# Patient Record
Sex: FEMALE | Race: WHITE
[De-identification: names, ages, dates, MRNs, and addresses within clinical notes are randomized per-mention and may not be internally consistent; named-entity substitution may affect disease eponyms.]

---

## 2018-03-20 ENCOUNTER — HOSPITAL ENCOUNTER (INPATIENT)
Dept: HOSPITAL 82 - ED | Age: 63
LOS: 3 days | Discharge: HOME | DRG: 189 | End: 2018-03-23
Attending: INTERNAL MEDICINE | Admitting: INTERNAL MEDICINE
Payer: OTHER GOVERNMENT

## 2018-03-20 VITALS — DIASTOLIC BLOOD PRESSURE: 52 MMHG | SYSTOLIC BLOOD PRESSURE: 107 MMHG

## 2018-03-20 VITALS — DIASTOLIC BLOOD PRESSURE: 56 MMHG | SYSTOLIC BLOOD PRESSURE: 106 MMHG

## 2018-03-20 VITALS — DIASTOLIC BLOOD PRESSURE: 71 MMHG | SYSTOLIC BLOOD PRESSURE: 137 MMHG

## 2018-03-20 VITALS — DIASTOLIC BLOOD PRESSURE: 59 MMHG | SYSTOLIC BLOOD PRESSURE: 100 MMHG

## 2018-03-20 VITALS — SYSTOLIC BLOOD PRESSURE: 107 MMHG | DIASTOLIC BLOOD PRESSURE: 56 MMHG

## 2018-03-20 VITALS — WEIGHT: 182.25 LBS | BODY MASS INDEX: 27.62 KG/M2 | HEIGHT: 68 IN

## 2018-03-20 VITALS — SYSTOLIC BLOOD PRESSURE: 115 MMHG | DIASTOLIC BLOOD PRESSURE: 61 MMHG

## 2018-03-20 VITALS — SYSTOLIC BLOOD PRESSURE: 115 MMHG | DIASTOLIC BLOOD PRESSURE: 63 MMHG

## 2018-03-20 VITALS — DIASTOLIC BLOOD PRESSURE: 52 MMHG | SYSTOLIC BLOOD PRESSURE: 120 MMHG

## 2018-03-20 VITALS — DIASTOLIC BLOOD PRESSURE: 64 MMHG | SYSTOLIC BLOOD PRESSURE: 103 MMHG

## 2018-03-20 DIAGNOSIS — J44.1: ICD-10-CM

## 2018-03-20 DIAGNOSIS — I24.8: ICD-10-CM

## 2018-03-20 DIAGNOSIS — F17.210: ICD-10-CM

## 2018-03-20 DIAGNOSIS — I44.7: ICD-10-CM

## 2018-03-20 DIAGNOSIS — Z91.14: ICD-10-CM

## 2018-03-20 DIAGNOSIS — I10: ICD-10-CM

## 2018-03-20 DIAGNOSIS — I34.0: ICD-10-CM

## 2018-03-20 DIAGNOSIS — J96.21: Primary | ICD-10-CM

## 2018-03-20 LAB
ALBUMIN SERPL-MCNC: 4.3 G/DL (ref 3.2–5)
ALP SERPL-CCNC: 69 U/L (ref 38–126)
ALT SERPL-CCNC: 19 U/L (ref 11–66)
ANION GAP SERPL CALCULATED.3IONS-SCNC: 27 MMOL/L
AST SERPL-CCNC: 20 U/L (ref 9–36)
BARBITURATES UR-MCNC: NEGATIVE UG/ML
BASOPHILS NFR BLD AUTO: 0 % (ref 0–3)
BILIRUB UR QL STRIP.AUTO: NEGATIVE
BUN SERPL-MCNC: 17 MG/DL (ref 8–23)
BUN/CREAT SERPL: 12
CHLORIDE SERPL-SCNC: 101 MMOL/L (ref 95–108)
CLARITY UR: (no result)
CO2 SERPL-SCNC: 16 MMOL/L (ref 22–30)
COCAINE UR-MCNC: NEGATIVE NG/ML
COLOR UR AUTO: YELLOW
CREAT SERPL-MCNC: 1.3 MG/DL (ref 0.5–1)
EOSINOPHIL NFR BLD AUTO: 0 % (ref 0–8)
EPI CELLS URNS QL MICRO: (no result) EPI/HPF
ERYTHROCYTE [DISTWIDTH] IN BLOOD BY AUTOMATED COUNT: 12.5 % (ref 11.5–15.5)
ETHANOL SERPL-MCNC: 0 MG/DL (ref 0–30)
GLUCOSE UR STRIP.AUTO-MCNC: NEGATIVE MG/DL
HCT VFR BLD AUTO: 51.5 % (ref 37–47)
HGB BLD-MCNC: 16.4 G/DL (ref 12–16)
HGB UR QL STRIP.AUTO: (no result)
IMM GRANULOCYTES NFR BLD: 3.2 % (ref 0–1)
INR PPP: 0.9 RATIO (ref 0.7–1.3)
KETONES UR STRIP.AUTO-MCNC: (no result) MG/DL
LEUKOCYTE ESTERASE UR QL STRIP.AUTO: NEGATIVE
LYMPHOCYTES NFR BLD: 25 % (ref 15–41)
MCH RBC QN AUTO: 33.2 PG  CALC (ref 26–32)
MCHC RBC AUTO-ENTMCNC: 31.8 G/L CALC (ref 32–36)
MCV RBC AUTO: 104.3 FL  CALC (ref 80–100)
METHADONE SERPL-MCNC: NEGATIVE NG/ML
MONOCYTES NFR BLD AUTO: 5 % (ref 2–13)
MYOGLOBIN SERPL-MCNC: 81 NG/ML (ref 0–62)
NEUTROPHILS # BLD AUTO: 15.58 THOU/UL (ref 2–7.15)
NEUTROPHILS NFR BLD AUTO: 66 % (ref 42–76)
NITRITE UR QL STRIP.AUTO: NEGATIVE
OXCYCODONE: NEGATIVE
PH UR STRIP.AUTO: 6 [PH] (ref 4.5–8)
PLATELET # BLD AUTO: 270 THOU/UL (ref 130–400)
POTASSIUM SERPL-SCNC: 5.3 MMOL/L (ref 3.5–5.1)
PROT SERPL-MCNC: 6.8 G/DL (ref 6.3–8.2)
PROT UR QL STRIP.AUTO: 30 MG/DL
PROTHROMBIN TIME: 9.9 SECONDS (ref 9–12.5)
RBC # BLD AUTO: 4.94 MILL/UL (ref 4.2–5.6)
RBC #/AREA URNS HPF: (no result) RBC/HPF (ref 0–5)
SODIUM SERPL-SCNC: 139 MMOL/L (ref 137–146)
SP GR UR STRIP.AUTO: 1.02
TETRAHYDROCANNABIONOL: NEGATIVE
TRICYLIC ANTIDEPRESSANTS: NEGATIVE
UROBILINOGEN UR QL STRIP.AUTO: 0.2 E.U./DL

## 2018-03-20 PROCEDURE — 0T9B70Z DRAINAGE OF BLADDER WITH DRAINAGE DEVICE, VIA NATURAL OR ARTIFICIAL OPENING: ICD-10-PCS | Performed by: EMERGENCY MEDICINE

## 2018-03-20 PROCEDURE — 5A09357 ASSISTANCE WITH RESPIRATORY VENTILATION, LESS THAN 24 CONSECUTIVE HOURS, CONTINUOUS POSITIVE AIRWAY PRESSURE: ICD-10-PCS | Performed by: EMERGENCY MEDICINE

## 2018-03-20 NOTE — NUR
Admission Note
 
 
Report Given to:         ANNE PRITNED TO FLOOR
Transported by:          X Wheelchair           Stretcher
 
Transported with:        X Nurse    X Transporter   X Patent IV   X O2
                         X Cardiac Monitor

## 2018-03-20 NOTE — NUR
PT DOING MUCH BETTER ON BiPAP NOW.  SATS IN THE HIGH 90s, PT ABLE TO SPEAK
CLEARLY OVER IT.  MEDS RECONCILED FROM LIST IN HER PAPER WORK.

## 2018-03-20 NOTE — NUR
PT'S UNCLE FRANTZ HALL LEAVES PHONE NUMBER [phone].  HE WAS THE ONE WHO
FOUND HER ON HER HANDS AND KNEES TODAY, GASPING TO BREATHE.

## 2018-03-20 NOTE — NUR
PT IN [place] A/O X3, RESPIRATIONS EVEN AND UNLABORED ON BIPAP 30%. O2 SAT
99%, LUNG SOUNDS COARSE AND WHEEZY.  VELAZQUEZ DRAINING DARK YELLOW URINE.  IS NPO
AT THIS TIME.  ENCOURAGED TO USE CALL LIGHT FOR ASSISTANCE.  WILL CONTINUE TO
MONITOR.  CALL LIGHT IN REACH.

## 2018-03-20 NOTE — NUR
PT CONTINUES ON BiPAP, DOING WELL.  UNCLE FRANTZ LEAVES WITH HER HOUSE AND CAR
KEYS, SAYS TO CALL HIM ANYTIME FOR HER RIDE HOME.

## 2018-03-20 NOTE — NUR
1243 PT ARRIVED TO ROOM VIA WHEELCHAIR, UNRESPONSIVE.
     PT WAS BAGGED UPON ARRIVAL BY RAMON RT.
1244 NARCAN GIVEN 0.4MG
1246 EKG DONE.  NARCAN 0.8 MG GIVEN.
1250 OPT FOR BiPAP INSTEAD OF INTUBATION AS PT RESPONSIVE
1255 PT AWAKE AND YELLING 95/43
1257 IV STARTED L HAND

## 2018-03-20 NOTE — NUR
IN [place] WATCHING TV, RESPIRATIONS EVEN AND UNLABORED ON BIPAP 30%, O2
%.  CALL LIGHT IN REACH.

## 2018-03-20 NOTE — NUR
PT SIITTIN UP ON STRETCHER W/BIPAP IN PLACE CONSTANTLY TALKING. PT ENCOURAGED
TO RELAX. FAN PROVIDED PER PT REQUEST.

## 2018-03-20 NOTE — NUR
female pt received to ICU bed 1 via stretcher accompanied by AMBER Darden,
RN and IVAN Jamison RT in stable condition; pt transferred to bed per staff; weight
obtained via bed scale; admission assessment completed at this time; pt alert
and oriented; very talkative; denies pain; no n/v noted; c/c fell over from
sob after attempting to move a large road sign; resp even and unlabored; lungs
coarse throughout with faint exp wheeze; skin color wnl; bipap settings at
18/6, rate 20, FiO2 30%; np cough noted; hr reg; sr on monitor; strong pulses;
trace edema noted to ble; bilat knee high kelvin hose placed; abd soft with bs
present; pt denies bm x2 days/ states norm; henning to gravity draining
slightly cloudy yellow urine; cath day intact; #18 in lh saline locked; no
redness or edema noted at site; plan of care/ meds explained; pt oriented to
bed and call light system; will continue to monitor

## 2018-03-20 NOTE — NUR
awake in bed; bipap maintained; offers no complaints; no distress/ resp
distress noted; iv intact; sr on monitor; henning to gravity; NPO explained; bed
in lowest position; call light within reach

## 2018-03-21 VITALS — SYSTOLIC BLOOD PRESSURE: 160 MMHG | DIASTOLIC BLOOD PRESSURE: 75 MMHG

## 2018-03-21 VITALS — DIASTOLIC BLOOD PRESSURE: 66 MMHG | SYSTOLIC BLOOD PRESSURE: 133 MMHG

## 2018-03-21 VITALS — DIASTOLIC BLOOD PRESSURE: 61 MMHG | SYSTOLIC BLOOD PRESSURE: 149 MMHG

## 2018-03-21 VITALS — SYSTOLIC BLOOD PRESSURE: 118 MMHG | DIASTOLIC BLOOD PRESSURE: 65 MMHG

## 2018-03-21 VITALS — SYSTOLIC BLOOD PRESSURE: 112 MMHG | DIASTOLIC BLOOD PRESSURE: 53 MMHG

## 2018-03-21 VITALS — DIASTOLIC BLOOD PRESSURE: 63 MMHG | SYSTOLIC BLOOD PRESSURE: 145 MMHG

## 2018-03-21 VITALS — SYSTOLIC BLOOD PRESSURE: 125 MMHG | DIASTOLIC BLOOD PRESSURE: 59 MMHG

## 2018-03-21 VITALS — SYSTOLIC BLOOD PRESSURE: 134 MMHG | DIASTOLIC BLOOD PRESSURE: 62 MMHG

## 2018-03-21 VITALS — DIASTOLIC BLOOD PRESSURE: 67 MMHG | SYSTOLIC BLOOD PRESSURE: 135 MMHG

## 2018-03-21 VITALS — DIASTOLIC BLOOD PRESSURE: 60 MMHG | SYSTOLIC BLOOD PRESSURE: 149 MMHG

## 2018-03-21 VITALS — DIASTOLIC BLOOD PRESSURE: 73 MMHG | SYSTOLIC BLOOD PRESSURE: 147 MMHG

## 2018-03-21 VITALS — SYSTOLIC BLOOD PRESSURE: 102 MMHG | DIASTOLIC BLOOD PRESSURE: 46 MMHG

## 2018-03-21 VITALS — SYSTOLIC BLOOD PRESSURE: 145 MMHG | DIASTOLIC BLOOD PRESSURE: 63 MMHG

## 2018-03-21 VITALS — DIASTOLIC BLOOD PRESSURE: 75 MMHG | SYSTOLIC BLOOD PRESSURE: 151 MMHG

## 2018-03-21 VITALS — SYSTOLIC BLOOD PRESSURE: 150 MMHG | DIASTOLIC BLOOD PRESSURE: 71 MMHG

## 2018-03-21 VITALS — DIASTOLIC BLOOD PRESSURE: 57 MMHG | SYSTOLIC BLOOD PRESSURE: 136 MMHG

## 2018-03-21 VITALS — DIASTOLIC BLOOD PRESSURE: 76 MMHG | SYSTOLIC BLOOD PRESSURE: 136 MMHG

## 2018-03-21 LAB
ANION GAP SERPL CALCULATED.3IONS-SCNC: 13 MMOL/L
BASOPHILS NFR BLD AUTO: 0 % (ref 0–3)
BUN SERPL-MCNC: 21 MG/DL (ref 8–23)
BUN/CREAT SERPL: 19
CHLORIDE SERPL-SCNC: 103 MMOL/L (ref 95–108)
CHOLEST SERPL-MCNC: 160 MG/DL (ref 0–199)
CHOLEST/HDLC SERPL: 2.5 {RATIO}
CO2 SERPL-SCNC: 27 MMOL/L (ref 22–30)
CREAT SERPL-MCNC: 1.1 MG/DL (ref 0.5–1)
EOSINOPHIL NFR BLD AUTO: 0 % (ref 0–8)
ERYTHROCYTE [DISTWIDTH] IN BLOOD BY AUTOMATED COUNT: 12.7 % (ref 11.5–15.5)
HCT VFR BLD AUTO: 41 % (ref 37–47)
HDLC SERPL-MCNC: 64 MG/DL (ref 40–?)
HGB BLD-MCNC: 13.6 G/DL (ref 12–16)
IMM GRANULOCYTES NFR BLD: 2.9 % (ref 0–1)
LDLC SERPL CALC-MCNC: 70 MG/DL
LYMPHOCYTES NFR BLD: 8 % (ref 15–41)
MAGNESIUM SERPL-MCNC: 1.7 MG/DL (ref 1.6–2.3)
MCH RBC QN AUTO: 33 PG  CALC (ref 26–32)
MCHC RBC AUTO-ENTMCNC: 33.2 G/L CALC (ref 32–36)
MCV RBC AUTO: 99.5 FL  CALC (ref 80–100)
MONOCYTES NFR BLD AUTO: 2 % (ref 2–13)
NEUTROPHILS # BLD AUTO: 16.22 THOU/UL (ref 2–7.15)
NEUTROPHILS NFR BLD AUTO: 87 % (ref 42–76)
PLATELET # BLD AUTO: 192 THOU/UL (ref 130–400)
POTASSIUM SERPL-SCNC: 5.3 MMOL/L (ref 3.5–5.1)
RBC # BLD AUTO: 4.12 MILL/UL (ref 4.2–5.6)
SODIUM SERPL-SCNC: 138 MMOL/L (ref 137–146)
TRIGL SERPL-MCNC: 135 MG/DL (ref 30–149)
VLDLC SERPL CALC-MCNC: 27 MG/DL

## 2018-03-21 NOTE — NUR
PT SITTING UP IN THE BED TALKING TO DAUGHTER WHO IS BEDSIDE, PT VERBALIZES NO
COMPLAINTS, SETUP ASSISTANCE PROVIDED WITH LUNCH, REMINDED TO CALL FOR
ASSISTANCE, CALL BELL WITHIN REACH

## 2018-03-21 NOTE — NUR
PT LAYING IN BED WATCHING TV, PT DENIES ANY SOB, HR 74, RESP. 18, /63,
O2 100% ON 2L VIA NC, COARSE LUNG SOUNDS WITH EXPIRATORY WHEEZES, STRONG
RADIAL PULSES, WEAK PEDAL PULSES, 18G LH IV, SALINE LOCKED, NO REDNESS OR
BLEEDING AT SITE, VELAZQUEZ CATH REMAINS IN PLACE, SECURED WITH A CATH STRAP,
DRAIN CLOUDY YELLOW URINE, AM ASSESSMENT COMPLETE, SEE INTERVENTIONS, SAFETY
MEASURES REINFORCED, CALL BELL WITHIN REACH

## 2018-03-21 NOTE — NUR
PT LAYING IN BED RESTING WITH EYES CLOSED, AROUSES EASILY TO VERBAL STIMULI,
PT DENIES ANY SOB, REMINDED TO CALL FOR ASSISTANCE, CALL BELL WITHIN REACH

## 2018-03-21 NOTE — NUR
awake. eating jelly beans.  denies resp diff. fine coarse breath sounds bilat.
cardiac monitor shows sinus rhythm. #18 lt hand saline lock. po fluids taken
well. voids well. fall precautions cont. daughter @ bedside.

## 2018-03-21 NOTE — NUR
PT SITTING UP IN BED WATCHING TV, RESPIRATIONS EVEN AND UNLABORED ON BIPAP
30%, O2 %.  ZOSYN INFUSING TO LH WITH NO COMPLICATIONS.  CALL LIGHT IN
REACH.

## 2018-03-21 NOTE — NUR
PT SITTING UP IN THE BED TALKING ON THE PORTABLE PHONE, NO S/S OF DISTRESS,
REMINDED TO CALL FOR ASSISTANCE, CALL BELL WITHIN REACH

## 2018-03-22 VITALS — DIASTOLIC BLOOD PRESSURE: 81 MMHG | SYSTOLIC BLOOD PRESSURE: 168 MMHG

## 2018-03-22 VITALS — SYSTOLIC BLOOD PRESSURE: 147 MMHG | DIASTOLIC BLOOD PRESSURE: 68 MMHG

## 2018-03-22 VITALS — DIASTOLIC BLOOD PRESSURE: 53 MMHG | SYSTOLIC BLOOD PRESSURE: 95 MMHG

## 2018-03-22 VITALS — SYSTOLIC BLOOD PRESSURE: 126 MMHG | DIASTOLIC BLOOD PRESSURE: 57 MMHG

## 2018-03-22 VITALS — DIASTOLIC BLOOD PRESSURE: 82 MMHG | SYSTOLIC BLOOD PRESSURE: 171 MMHG

## 2018-03-22 VITALS — SYSTOLIC BLOOD PRESSURE: 142 MMHG | DIASTOLIC BLOOD PRESSURE: 79 MMHG

## 2018-03-22 VITALS — SYSTOLIC BLOOD PRESSURE: 124 MMHG | DIASTOLIC BLOOD PRESSURE: 60 MMHG

## 2018-03-22 VITALS — SYSTOLIC BLOOD PRESSURE: 161 MMHG | DIASTOLIC BLOOD PRESSURE: 71 MMHG

## 2018-03-22 VITALS — SYSTOLIC BLOOD PRESSURE: 169 MMHG | DIASTOLIC BLOOD PRESSURE: 74 MMHG

## 2018-03-22 VITALS — DIASTOLIC BLOOD PRESSURE: 58 MMHG | SYSTOLIC BLOOD PRESSURE: 155 MMHG

## 2018-03-22 VITALS — DIASTOLIC BLOOD PRESSURE: 58 MMHG | SYSTOLIC BLOOD PRESSURE: 156 MMHG

## 2018-03-22 VITALS — SYSTOLIC BLOOD PRESSURE: 153 MMHG | DIASTOLIC BLOOD PRESSURE: 67 MMHG

## 2018-03-22 VITALS — DIASTOLIC BLOOD PRESSURE: 74 MMHG | SYSTOLIC BLOOD PRESSURE: 175 MMHG

## 2018-03-22 LAB
ANION GAP SERPL CALCULATED.3IONS-SCNC: 14 MMOL/L
BUN SERPL-MCNC: 22 MG/DL (ref 8–23)
BUN/CREAT SERPL: 19
CHLORIDE SERPL-SCNC: 100 MMOL/L (ref 95–108)
CO2 SERPL-SCNC: 29 MMOL/L (ref 22–30)
CREAT SERPL-MCNC: 1.1 MG/DL (ref 0.5–1)
ERYTHROCYTE [DISTWIDTH] IN BLOOD BY AUTOMATED COUNT: 12.7 % (ref 11.5–15.5)
HCT VFR BLD AUTO: 40.9 % (ref 37–47)
HGB BLD-MCNC: 13.7 G/DL (ref 12–16)
MAGNESIUM SERPL-MCNC: 1.8 MG/DL (ref 1.6–2.3)
MCH RBC QN AUTO: 33 PG  CALC (ref 26–32)
MCHC RBC AUTO-ENTMCNC: 33.5 G/L CALC (ref 32–36)
MCV RBC AUTO: 98.6 FL  CALC (ref 80–100)
PLATELET # BLD AUTO: 194 THOU/UL (ref 130–400)
POTASSIUM SERPL-SCNC: 4.4 MMOL/L (ref 3.5–5.1)
RBC # BLD AUTO: 4.15 MILL/UL (ref 4.2–5.6)
SODIUM SERPL-SCNC: 138 MMOL/L (ref 137–146)

## 2018-03-22 NOTE — NUR
awake; no distress noted; offers no complaints; deny needs; iv patent; abt
infusing without complication; no redness or edema noted at site; sr on
monitor; ra; call light within reach; will continue to monitor

## 2018-03-22 NOTE — NUR
awake in bed; offers no complaints; no distress noted; iv intact; sr on
monitor; ra; deny needs; call light within reach; will continue to monitor

## 2018-03-22 NOTE — NUR
awake in bed watching tv; no distress noted; pt offers no complaints;
assessment completed at this time; pt alert and oriented; denies pain; denies
difficulty breathing; resp even and unlabored; lungs coarse; skin color wnl;
o2 per nc at 2L/sat 100%; pt converted to RA at this time; will continue to
monitor resp status/o2 sat; np cough noted; hr reg; strong pulses; no edema
noted; bilat knee high kelvin hose intact; sr on monitor; abd soft with bs
present; no bm noted per writer; no urine to inspect at this time; pt denies
pain or burning s/p catheter removal; #18 in lh flushed and patent; no redness
or edema noted at site; plan of care/ am meds explained; call light within
reach; will continue to monitor

## 2018-03-22 NOTE — NUR
awake in bed; tearful; admits to being upset about recent admissions and
insurance issues; reassured; iv intact; sr on monitor; ra; no distress noted;
resp even and unlabored; deny needs; call light within reach; will continue to
monitor

## 2018-03-22 NOTE — NUR
awake; assist to bathroom as per request; bath and oral care offered with pt
refusal; states "maybe later"; complete linen change done; medicated as per
orders; sr on monitor; iv intact; ra; pt offers no complaints; call light
within reach; will continue to monitor

## 2018-03-22 NOTE — NUR
pt awake in bed; daughter at bedside; no distress noted; resp even and
unlabored; iv patent; no redness or edema noted at site; sr on monitor; bed in
lowest position; call light within reach

## 2018-03-22 NOTE — NUR
awake in bed; offers no complaints; no distress noted; sr on monitor; o2 sat
97% on RA; iv intact; call light within reach; will continue to monitor

## 2018-03-22 NOTE — NUR
awake. no acute distress. cardiac monitor shows sinus rhythm ivcd. #18 lt hand
saline lock. po fluids taken well. voids per bathroom. speaks of anxiety
d/t "everything that is wrong." admits to "don't smoke much." instructed pt
about need to stop smoking. pt verbalized understanding. fall precautions
cont. requested "something for anxiety." dr rivas notified. orders rec'd.

## 2018-03-23 VITALS — SYSTOLIC BLOOD PRESSURE: 167 MMHG | DIASTOLIC BLOOD PRESSURE: 80 MMHG

## 2018-03-23 VITALS — SYSTOLIC BLOOD PRESSURE: 164 MMHG | DIASTOLIC BLOOD PRESSURE: 73 MMHG

## 2018-03-23 VITALS — DIASTOLIC BLOOD PRESSURE: 62 MMHG | SYSTOLIC BLOOD PRESSURE: 131 MMHG

## 2018-03-23 VITALS — DIASTOLIC BLOOD PRESSURE: 73 MMHG | SYSTOLIC BLOOD PRESSURE: 163 MMHG

## 2018-03-23 VITALS — DIASTOLIC BLOOD PRESSURE: 56 MMHG | SYSTOLIC BLOOD PRESSURE: 113 MMHG

## 2018-03-23 VITALS — SYSTOLIC BLOOD PRESSURE: 156 MMHG | DIASTOLIC BLOOD PRESSURE: 69 MMHG

## 2018-03-23 VITALS — SYSTOLIC BLOOD PRESSURE: 118 MMHG | DIASTOLIC BLOOD PRESSURE: 60 MMHG

## 2018-03-23 VITALS — SYSTOLIC BLOOD PRESSURE: 178 MMHG | DIASTOLIC BLOOD PRESSURE: 80 MMHG

## 2018-03-23 LAB
ANION GAP SERPL CALCULATED.3IONS-SCNC: 14 MMOL/L
BUN SERPL-MCNC: 26 MG/DL (ref 8–23)
BUN/CREAT SERPL: 21
CHLORIDE SERPL-SCNC: 96 MMOL/L (ref 95–108)
CO2 SERPL-SCNC: 35 MMOL/L (ref 22–30)
CREAT SERPL-MCNC: 1.3 MG/DL (ref 0.5–1)
ERYTHROCYTE [DISTWIDTH] IN BLOOD BY AUTOMATED COUNT: 12.6 % (ref 11.5–15.5)
HCT VFR BLD AUTO: 43.5 % (ref 37–47)
HGB BLD-MCNC: 14.4 G/DL (ref 12–16)
MCH RBC QN AUTO: 33.3 PG  CALC (ref 26–32)
MCHC RBC AUTO-ENTMCNC: 33.1 G/L CALC (ref 32–36)
MCV RBC AUTO: 100.5 FL  CALC (ref 80–100)
PLATELET # BLD AUTO: 208 THOU/UL (ref 130–400)
POTASSIUM SERPL-SCNC: 5 MMOL/L (ref 3.5–5.1)
RBC # BLD AUTO: 4.33 MILL/UL (ref 4.2–5.6)
SODIUM SERPL-SCNC: 140 MMOL/L (ref 137–146)

## 2018-03-23 NOTE — NUR
pt awake sitting up in bed; no distress noted; pt offers no complaints;
assessment completed at this time; pt alert and oriented; denies pain; no n/v
noted; denies sob; resp even and unlabored; lungs coarse with faint exp
wheeze; skin color wnl; ra; hr reg; strong pulses; no edema noted; sr on
monitor; abd soft with bs present; pt denies bm; pt admits to voiding without
pain or burning; no urine to inspect at this time; hat placed in commode for
accurate output; #18 in lh flushed and patent; no redness or edema noted at
site; plan of care/ am meds explained; call light within reach; will continue
to monitor

## 2018-03-23 NOTE — NUR
pt awake in bed speaking with daughter; no distress noted; 99% on ra; sr on
monitor; iv intact; call light within reach; will continue to monitor

## 2018-03-23 NOTE — NUR
awake; no distress noted; offers no complaints; sr on monitor; deny needs;
call light within reach; will continue to monitor

## 2018-03-23 NOTE — NUR
awake in recliner; offers no complaints; sr on monitor; iv intact; no redness
or edema noted at site; call light within reach; will continue to monitor

## 2018-03-23 NOTE — NUR
pt up to recliner; no distress noted; pt offer no complaints; bathe self/
brushed teeth; iv intact; no redness or edema noted at site; sr on monitor;
ra; call light within reach; will continue to monitor

## 2018-03-23 NOTE — NUR
[facility] rep at bedside; pt released new prescriptions with
[facility] rep; pt agree to return to [facility] to  medications;

## 2018-03-23 NOTE — NUR
discharge instructions reviewed with pt in detail; pt explained the importance
of following up with physicians and taking medications as presrcibed; iv
removed from lh with catheter tip intact; no redness or edema noted at site;
pt awaiting ride; will continue to monitor